# Patient Record
Sex: FEMALE | Race: WHITE | NOT HISPANIC OR LATINO
[De-identification: names, ages, dates, MRNs, and addresses within clinical notes are randomized per-mention and may not be internally consistent; named-entity substitution may affect disease eponyms.]

---

## 2017-01-13 ENCOUNTER — HOSPITAL (OUTPATIENT)
Dept: OTHER | Age: 22
End: 2017-01-13
Attending: INTERNAL MEDICINE

## 2021-07-06 PROBLEM — F41.9 ANXIETY: Status: ACTIVE | Noted: 2021-07-06

## 2021-07-06 PROCEDURE — 88175 CYTOPATH C/V AUTO FLUID REDO: CPT | Performed by: OBSTETRICS & GYNECOLOGY

## 2021-07-06 NOTE — PROGRESS NOTES
GYN H&P   NEW PT    2021  2:24 PM    CC: Patient is here for birth control, annual, anxiety    HPI: Patient is a 23year old  for birth control. In serious relationship and would like bc.  She has not been sexually active in the past and has neve History      Marital status: Single      Spouse name: Not on file      Number of children: Not on file      Years of education: Not on file      Highest education level: Not on file    Occupational History      Occupation:     Tobacco Use      Smoking the patient the procedure. I discussed the normal SE of break through vaginal bleeding which could last up to 90 days as well as some short term lower abdominal cramping.   I discussed with her to call me if she noticed worsening lower abdominal pain, aysha

## 2021-07-08 ENCOUNTER — TELEPHONE (OUTPATIENT)
Dept: OBGYN CLINIC | Facility: CLINIC | Age: 26
End: 2021-07-08

## 2021-07-08 NOTE — TELEPHONE ENCOUNTER
Patient has questions on the after effect of getting the IUD. Patient is currently feeling discomfort from her IUD.

## 2021-07-08 NOTE — TELEPHONE ENCOUNTER
Called pt and c/o nausea with cramps. Informed pt to take Ibuprofen for cramps and give her body time to become accustom to the IUD. Advised to call back if severe abd pain and saturating her pad hourly causing lightheadedness/dizziness.   Encouraged to se Statement Selected

## 2021-08-19 ENCOUNTER — OFFICE VISIT (OUTPATIENT)
Dept: OBGYN CLINIC | Facility: CLINIC | Age: 26
End: 2021-08-19
Payer: COMMERCIAL

## 2021-08-19 VITALS
BODY MASS INDEX: 20.55 KG/M2 | DIASTOLIC BLOOD PRESSURE: 74 MMHG | WEIGHT: 116 LBS | HEIGHT: 63 IN | SYSTOLIC BLOOD PRESSURE: 110 MMHG

## 2021-08-19 DIAGNOSIS — Z30.431 IUD CHECK UP: Primary | ICD-10-CM

## 2021-08-19 DIAGNOSIS — F41.9 ANXIETY: ICD-10-CM

## 2021-08-19 PROCEDURE — 3074F SYST BP LT 130 MM HG: CPT | Performed by: OBSTETRICS & GYNECOLOGY

## 2021-08-19 PROCEDURE — 3008F BODY MASS INDEX DOCD: CPT | Performed by: OBSTETRICS & GYNECOLOGY

## 2021-08-19 PROCEDURE — 99213 OFFICE O/P EST LOW 20 MIN: CPT | Performed by: OBSTETRICS & GYNECOLOGY

## 2021-08-19 PROCEDURE — 3078F DIAST BP <80 MM HG: CPT | Performed by: OBSTETRICS & GYNECOLOGY

## 2021-08-19 RX ORDER — LEVONORGESTREL 19.5 MG/1
1 INTRAUTERINE DEVICE INTRAUTERINE ONCE
COMMUNITY

## 2021-08-19 NOTE — PROGRESS NOTES
CC: Patient is here for IUD check up. HPI: Patient is a 23year old  for IUD check. She has had spotting, but no heavy bleeding, some mild cramps. She has been using Lexapro and feels like her anxiety is better.  She has not found a therapist Asked        Hobby Hazards: Not Asked        Sleep Concern: Not Asked        Stress Concern: Not Asked        Weight Concern: Not Asked        Special Diet: Not Asked        Back Care: Not Asked        Exercise: Not Asked        Bike Helmet: Not Asked Uterus: midline, mobile, non-tender, firm and smooth  Cervix: pink, no lesions grossly visible, no discharge - IUD string 1 cm from os  Adnexa: non tender, no palpable masses, normal size  Anus:  No lesions or visible hemorrhoids        A/P: Patient is 3

## 2021-12-28 ENCOUNTER — TELEPHONE (OUTPATIENT)
Dept: OBGYN CLINIC | Facility: CLINIC | Age: 26
End: 2021-12-28

## 2021-12-28 NOTE — TELEPHONE ENCOUNTER
The patient is calling with a question about her medication:      escitalopram (LEXAPRO) 10 MG Oral Tab

## 2021-12-28 NOTE — TELEPHONE ENCOUNTER
RN contacted pt. Pt wanting to discuss alternate medications due to pt's cardiologist concerns with lexapro. RN scheduled pt with MP to establish care. Appt scheduled for tomorrow at 56 Pt agrees to [address] [facility].

## 2021-12-29 ENCOUNTER — MED REC SCAN ONLY (OUTPATIENT)
Dept: FAMILY MEDICINE CLINIC | Facility: CLINIC | Age: 26
End: 2021-12-29

## 2021-12-29 PROBLEM — G40.909 SEIZURE DISORDER (HCC): Status: ACTIVE | Noted: 2021-12-29

## 2021-12-29 PROBLEM — G47.00 INSOMNIA: Status: ACTIVE | Noted: 2021-12-29

## 2021-12-29 PROBLEM — F32.A DEPRESSIVE DISORDER: Status: ACTIVE | Noted: 2021-12-29

## 2021-12-29 PROBLEM — F41.1 GAD (GENERALIZED ANXIETY DISORDER): Status: ACTIVE | Noted: 2021-12-29

## 2021-12-29 PROBLEM — R94.31 QT PROLONGATION: Status: ACTIVE | Noted: 2021-12-29

## 2021-12-29 NOTE — PROGRESS NOTES
HPI:    Patient ID: Abimbola Herrera is a 23year old female who presents for anxiety medication. HPI  Takes keppra 2000mg in PM for epilepsy. Recently diagnosed in 10/2021.  Thinks she had been having seizures in her sleep for at least 1 year b/c she'd Coughing    Family History   Problem Relation Age of Onset   • Other (Liver Cancer) Paternal Grandmother 79   • Other (Lung Cancer) Paternal Grandfather 36   • Other (Lung cancer) Paternal Uncle    • Heart Disease Maternal Uncle         Great uncle   • breath. Cardiovascular: Negative. Negative for chest pain and palpitations. Neurological: Positive for seizures. Negative for dizziness, syncope and light-headedness. Psychiatric/Behavioral: Positive for dysphoric mood and sleep disturbance.  The pa Gait: Gait normal.   Psychiatric:         Mood and Affect: Mood normal.         Behavior: Behavior normal.         Thought Content:  Thought content normal.         Judgment: Judgment normal.             ASSESSMENT/PLAN:   Mike (generalized anxiety disorder)

## 2022-01-28 ENCOUNTER — MED REC SCAN ONLY (OUTPATIENT)
Dept: FAMILY MEDICINE CLINIC | Facility: CLINIC | Age: 27
End: 2022-01-28

## 2022-03-07 ENCOUNTER — TELEPHONE (OUTPATIENT)
Dept: FAMILY MEDICINE CLINIC | Facility: CLINIC | Age: 27
End: 2022-03-07

## 2022-03-07 RX ORDER — VENLAFAXINE HYDROCHLORIDE 37.5 MG/1
37.5 TABLET, EXTENDED RELEASE ORAL DAILY
Qty: 90 TABLET | Refills: 3 | Status: SHIPPED | OUTPATIENT
Start: 2022-03-07

## 2022-03-07 NOTE — TELEPHONE ENCOUNTER
Venlafaxine HCl ER 37.5 MG Oral Tablet 24 Hr    [facility] [id number] - [facility], [address] [phone], [phone]      Pt requesting a couple of refills so she does not have to call to renew each time if possible. Please contact patient to confirm.

## 2022-03-24 ENCOUNTER — MED REC SCAN ONLY (OUTPATIENT)
Dept: FAMILY MEDICINE CLINIC | Facility: CLINIC | Age: 27
End: 2022-03-24

## 2022-08-15 ENCOUNTER — OFFICE VISIT (OUTPATIENT)
Dept: OBGYN CLINIC | Facility: CLINIC | Age: 27
End: 2022-08-15
Payer: COMMERCIAL

## 2022-08-15 VITALS
HEIGHT: 63 IN | WEIGHT: 128 LBS | BODY MASS INDEX: 22.68 KG/M2 | DIASTOLIC BLOOD PRESSURE: 60 MMHG | SYSTOLIC BLOOD PRESSURE: 102 MMHG

## 2022-08-15 DIAGNOSIS — N94.6 DYSMENORRHEA: ICD-10-CM

## 2022-08-15 DIAGNOSIS — Z30.431 IUD CHECK UP: Primary | ICD-10-CM

## 2022-08-15 PROCEDURE — 3078F DIAST BP <80 MM HG: CPT | Performed by: OBSTETRICS & GYNECOLOGY

## 2022-08-15 PROCEDURE — 99213 OFFICE O/P EST LOW 20 MIN: CPT | Performed by: OBSTETRICS & GYNECOLOGY

## 2022-08-15 PROCEDURE — 87591 N.GONORRHOEAE DNA AMP PROB: CPT | Performed by: OBSTETRICS & GYNECOLOGY

## 2022-08-15 PROCEDURE — 3074F SYST BP LT 130 MM HG: CPT | Performed by: OBSTETRICS & GYNECOLOGY

## 2022-08-15 PROCEDURE — 87491 CHLMYD TRACH DNA AMP PROBE: CPT | Performed by: OBSTETRICS & GYNECOLOGY

## 2022-08-15 PROCEDURE — 3008F BODY MASS INDEX DOCD: CPT | Performed by: OBSTETRICS & GYNECOLOGY

## 2022-08-15 RX ORDER — NAPROXEN 500 MG/1
500 TABLET ORAL 2 TIMES DAILY WITH MEALS
Qty: 30 TABLET | Refills: 1 | Status: SHIPPED | OUTPATIENT
Start: 2022-08-15 | End: 2022-08-20

## 2022-08-16 ENCOUNTER — HOSPITAL ENCOUNTER (OUTPATIENT)
Dept: ULTRASOUND IMAGING | Age: 27
Discharge: HOME | End: 2022-08-16
Attending: OBSTETRICS & GYNECOLOGY
Payer: COMMERCIAL

## 2022-08-16 DIAGNOSIS — Z30.431 IUD CHECK UP: ICD-10-CM

## 2022-08-16 DIAGNOSIS — N94.6 DYSMENORRHEA: ICD-10-CM

## 2022-08-16 LAB
C TRACH DNA SPEC QL NAA+PROBE: NEGATIVE
N GONORRHOEA DNA SPEC QL NAA+PROBE: NEGATIVE

## 2022-08-16 PROCEDURE — 76856 US EXAM PELVIC COMPLETE: CPT | Performed by: OBSTETRICS & GYNECOLOGY

## 2022-08-16 PROCEDURE — 76830 TRANSVAGINAL US NON-OB: CPT | Performed by: OBSTETRICS & GYNECOLOGY

## 2023-01-17 ENCOUNTER — OFFICE VISIT (OUTPATIENT)
Dept: OBGYN CLINIC | Facility: CLINIC | Age: 28
End: 2023-01-17
Payer: COMMERCIAL

## 2023-01-17 VITALS
DIASTOLIC BLOOD PRESSURE: 64 MMHG | SYSTOLIC BLOOD PRESSURE: 100 MMHG | WEIGHT: 127 LBS | HEIGHT: 63 IN | BODY MASS INDEX: 22.5 KG/M2

## 2023-01-17 DIAGNOSIS — Z30.432 ENCOUNTER FOR REMOVAL OF INTRAUTERINE CONTRACEPTIVE DEVICE: ICD-10-CM

## 2023-01-17 DIAGNOSIS — Z30.09 FAMILY PLANNING: Primary | ICD-10-CM

## 2023-01-17 PROCEDURE — 58301 REMOVE INTRAUTERINE DEVICE: CPT | Performed by: OBSTETRICS & GYNECOLOGY

## 2023-01-17 PROCEDURE — 99213 OFFICE O/P EST LOW 20 MIN: CPT | Performed by: OBSTETRICS & GYNECOLOGY

## 2023-01-17 PROCEDURE — 3074F SYST BP LT 130 MM HG: CPT | Performed by: OBSTETRICS & GYNECOLOGY

## 2023-01-17 PROCEDURE — 3078F DIAST BP <80 MM HG: CPT | Performed by: OBSTETRICS & GYNECOLOGY

## 2023-01-17 PROCEDURE — 3008F BODY MASS INDEX DOCD: CPT | Performed by: OBSTETRICS & GYNECOLOGY

## 2023-03-02 RX ORDER — VENLAFAXINE HYDROCHLORIDE 37.5 MG/1
37.5 TABLET, EXTENDED RELEASE ORAL DAILY
Qty: 90 TABLET | Refills: 0 | Status: SHIPPED | OUTPATIENT
Start: 2023-03-02

## 2023-03-02 NOTE — TELEPHONE ENCOUNTER
Please review. Protocol Failed or has No Protocol. Requested Prescriptions   Pending Prescriptions Disp Refills    Venlafaxine HCl ER 37.5 MG Oral Tablet 24 Hr 90 tablet 3     Sig: Take 1 tablet (37.5 mg total) by mouth daily.        There is no refill protocol information for this order              Recent Outpatient Visits              1 month ago [address] Dave Carrizales MD    Office Visit    6 months ago IUD check up    [address] Dave Carrizales MD    Office Visit    8 months ago MICHELLE (generalized anxiety disorder)    Daniel Asif, [address],     Office Visit    1 year ago MICHELLE (generalized anxiety disorder)    DO Wendy    Office Visit    1 year ago MICHELLE (generalized anxiety disorder)    Daniel Asif, [place], [place]    Office Visit

## 2023-07-31 RX ORDER — VENLAFAXINE HYDROCHLORIDE 37.5 MG/1
37.5 CAPSULE, EXTENDED RELEASE ORAL DAILY
Qty: 30 CAPSULE | Refills: 0 | Status: SHIPPED | OUTPATIENT
Start: 2023-07-31

## 2023-08-29 RX ORDER — VENLAFAXINE HYDROCHLORIDE 37.5 MG/1
37.5 CAPSULE, EXTENDED RELEASE ORAL DAILY
Qty: 30 CAPSULE | Refills: 0 | Status: SHIPPED | OUTPATIENT
Start: 2023-08-29

## 2023-09-26 RX ORDER — VENLAFAXINE HYDROCHLORIDE 37.5 MG/1
37.5 CAPSULE, EXTENDED RELEASE ORAL DAILY
Qty: 10 CAPSULE | Refills: 0 | Status: SHIPPED | OUTPATIENT
Start: 2023-09-26

## 2023-09-26 NOTE — TELEPHONE ENCOUNTER
Please review. Protocol failed / Has no protocol. Recent Visits  Date Type Provider Dept   06/23/22 Office Visit Yamileth Blanco DO [facility]   Showing recent visits within past 540 days with a meds authorizing provider and meeting all other requirements  Future Appointments  No visits were found meeting these conditions.   Showing future appointments within next 150 days with a meds authorizing provider and meeting all other requirements      Requested Prescriptions   Pending Prescriptions Disp Refills    VENLAFAXINE ER 37.5 MG Oral Capsule SR 24 Hr [Pharmacy Med Name: VENLAFAXINE HCL ER 37.5 MG CAP] 30 capsule 0     Sig: TAKE 1 CAPSULE BY MOUTH DAILY       Psychiatric Non-Scheduled (Anti-Anxiety) Failed - 9/26/2023  5:04 AM        Failed - In person appointment or virtual visit in the past 6 mos or appointment in next 3 mos     Recent Outpatient Visits              8 months ago Family planning    [address], [address], [address] [facility] Dave Julien MD    Office Visit    1 year ago IUD check up    [address], [address], [address] [facility] Dave Julien MD    Office Visit    1 year ago MICHELLE (generalized anxiety disorder)    [address], [address], [facility] Yamileth Blanco DO    Office Visit    1 year ago MICHELLE (generalized anxiety disorder)    [address] [place] [address] Yamileth Blanco DO    Office Visit    1 year ago MICHELLE (generalized anxiety disorder)    [address] [place] [address] Yamileth Blanco DO    Office Visit                            Recent Outpatient Visits              8 months ago Family planning    Khalida Weldon MD    Office Visit    1 year ago IUD check up    [address], [address], Em Parra MD    Office Visit    1 year ago MICHELLE (generalized anxiety disorder)    [facility], [address] [place] [address], [address] Audrey Gonzales [facility]    Office Visit    1 year ago MICHELLE (generalized anxiety disorder)    [facility], [address] [place] [address], [address] Audrey Gonzales     Office Visit    1 year ago MICHELLE (generalized anxiety disorder)    [facility], [address], [facility] Audrey Gonzales [place]    Office Visit

## 2023-10-05 RX ORDER — VENLAFAXINE HYDROCHLORIDE 37.5 MG/1
37.5 CAPSULE, EXTENDED RELEASE ORAL DAILY
Qty: 60 CAPSULE | Refills: 0 | Status: SHIPPED | OUTPATIENT
Start: 2023-10-05

## 2023-10-05 NOTE — TELEPHONE ENCOUNTER
Refill passed per [address] [place] protocol. Please see patients [address]  P Em Triage SupportYesterday (8:55 AM)     Mino Saunders  Refills have been requested for the following medications:         venlafaxine ER 37.5 MG Oral Capsule SR 24 Hr [Chelsea Holt]      Patient Comment: Would it be possible to get a 30 or 60-day supply so that I don't have to request a refill every week? Please see message below for upcoming appointment. Future Appointments   Date Time Provider Memo Thomas   12/5/2023  9:30 AM Anahi Pérez DO [facility]       Requested Prescriptions   Pending Prescriptions Disp Refills    venlafaxine ER 37.5 MG Oral Capsule SR 24 Hr 10 capsule 0     Sig: Take 1 capsule (37.5 mg total) by mouth daily. **Appointment needed for further refills.        Psychiatric Non-Scheduled (Anti-Anxiety) Passed - 10/4/2023  8:55 AM        Passed - In person appointment or virtual visit in the past 6 mos or appointment in next 3 mos     Recent Outpatient Visits              8 months ago [address] Dave Gonzalez MD    Office Visit    1 year ago IUD check up    [address], [address] Dave Gonzalez MD    Office Visit    1 year ago MICHELLE (generalized anxiety disorder)    [address], [address], [facility] Anahi Pérez DO    Office Visit    1 year ago MICHELLE (generalized anxiety disorder)    [address] [place] [address] Anahi Pérez DO    Office Visit    1 year ago MICHELLE (generalized anxiety disorder)    [address] [place] [address] Anahi Pérez DO    Office Visit          Future Appointments         Provider Department Appt Notes    In 2 months Anahi Pérez DO [address] [place] [address] Just a routine physical                       Recent Outpatient Visits              8 months ago Family planning    [address], [facility] [address] Dave Villalobos MD    Office Visit    1 year ago IUD check up    [address], [address], [address] Dave Villalobos MD    Office Visit    1 year ago MICHELLE (generalized anxiety disorder)    [address] [facility] Dexter Sanders DO    Office Visit    1 year ago MICHELLE (generalized anxiety disorder)    [address] [facility] Dexter Sanders DO    Office Visit    1 year ago MICHELLE (generalized anxiety disorder)    [address] [facility], [facility] Dexter Sanders [place]    Office Visit          Future Appointments         Provider Department Appt Notes    [facility] [address] Just a routine physical

## 2023-12-05 ENCOUNTER — OFFICE VISIT (OUTPATIENT)
Facility: CLINIC | Age: 28
End: 2023-12-05
Payer: COMMERCIAL

## 2023-12-05 VITALS
SYSTOLIC BLOOD PRESSURE: 116 MMHG | BODY MASS INDEX: 23.39 KG/M2 | HEIGHT: 63 IN | DIASTOLIC BLOOD PRESSURE: 72 MMHG | OXYGEN SATURATION: 98 % | WEIGHT: 132 LBS | HEART RATE: 84 BPM

## 2023-12-05 DIAGNOSIS — R94.31 QT PROLONGATION: ICD-10-CM

## 2023-12-05 DIAGNOSIS — L60.0 INGROWN TOENAIL OF RIGHT FOOT: ICD-10-CM

## 2023-12-05 DIAGNOSIS — Z00.00 PHYSICAL EXAM, ANNUAL: Primary | ICD-10-CM

## 2023-12-05 DIAGNOSIS — F32.A DEPRESSIVE DISORDER: ICD-10-CM

## 2023-12-05 DIAGNOSIS — F41.1 GAD (GENERALIZED ANXIETY DISORDER): ICD-10-CM

## 2023-12-05 PROCEDURE — 90471 IMMUNIZATION ADMIN: CPT | Performed by: FAMILY MEDICINE

## 2023-12-05 PROCEDURE — 99395 PREV VISIT EST AGE 18-39: CPT | Performed by: FAMILY MEDICINE

## 2023-12-05 PROCEDURE — 3074F SYST BP LT 130 MM HG: CPT | Performed by: FAMILY MEDICINE

## 2023-12-05 PROCEDURE — 3008F BODY MASS INDEX DOCD: CPT | Performed by: FAMILY MEDICINE

## 2023-12-05 PROCEDURE — 90715 TDAP VACCINE 7 YRS/> IM: CPT | Performed by: FAMILY MEDICINE

## 2023-12-05 PROCEDURE — 3078F DIAST BP <80 MM HG: CPT | Performed by: FAMILY MEDICINE

## 2023-12-05 PROCEDURE — 99214 OFFICE O/P EST MOD 30 MIN: CPT | Performed by: FAMILY MEDICINE

## 2023-12-05 RX ORDER — VENLAFAXINE HYDROCHLORIDE 75 MG/1
75 CAPSULE, EXTENDED RELEASE ORAL DAILY
Qty: 90 CAPSULE | Refills: 0 | Status: SHIPPED | OUTPATIENT
Start: 2023-12-05

## 2023-12-05 RX ORDER — VENLAFAXINE HYDROCHLORIDE 37.5 MG/1
37.5 CAPSULE, EXTENDED RELEASE ORAL DAILY
Qty: 60 CAPSULE | Refills: 0 | OUTPATIENT
Start: 2023-12-05

## 2023-12-12 DIAGNOSIS — Z30.09 FAMILY PLANNING: ICD-10-CM

## 2023-12-12 RX ORDER — NORGESTREL-ETHINYL ESTRADIOL 0.3-0.03MG
1 TABLET ORAL DAILY
Qty: 84 TABLET | Refills: 0 | Status: SHIPPED | OUTPATIENT
Start: 2023-12-12 | End: 2024-03-04

## 2024-03-02 DIAGNOSIS — Z30.09 FAMILY PLANNING: ICD-10-CM

## 2024-03-04 RX ORDER — NORGESTREL-ETHINYL ESTRADIOL 0.3-0.03MG
1 TABLET ORAL DAILY
Qty: 84 TABLET | Refills: 0 | Status: SHIPPED | OUTPATIENT
Start: 2024-03-04 | End: 2024-06-03

## 2024-03-11 RX ORDER — VENLAFAXINE HYDROCHLORIDE 75 MG/1
75 CAPSULE, EXTENDED RELEASE ORAL DAILY
Qty: 90 CAPSULE | Refills: 3 | Status: SHIPPED | OUTPATIENT
Start: 2024-03-11

## 2024-03-11 NOTE — TELEPHONE ENCOUNTER
Refill passed per [facility] protocol.    Requested Prescriptions   Pending Prescriptions Disp Refills    venlafaxine ER 75 MG Oral Capsule SR 24 Hr 90 capsule 0     Sig: Take 1 capsule (75 mg total) by mouth daily.       Psychiatric Non-Scheduled (Anti-Anxiety) Passed - 3/11/2024  8:34 AM        Passed - In person appointment or virtual visit in the past 6 mos or appointment in next 3 mos     Recent Outpatient Visits              3 months ago Physical exam, annual    [facility] Rober Boston DO    Office Visit    1 year ago Family planning    [facility] - OB/GYN Kimberly Chaparro MD    Office Visit    1 year ago IUD check up    [facility]/Kimberly Gamble MD    Office Visit    1 year ago MICHELLE (generalized anxiety disorder)    [facility] Rober Boston DO    Office Visit    2 years ago MICHELLE (generalized anxiety disorder)    [facility], [facility] Rober Boston DO    Office Visit          Future Appointments         Provider Department Appt Notes    In 1 month Rober Boston DO [facility] Wanted to follow up about medication dosage increase               Passed - Depression Screening completed within the past 12 months             Recent Outpatient Visits              3 months ago Physical exam, annual    [facility], [facility] Rober Boston DO    Office Visit    1 year ago Family planning    [facility] - OB/GYN Kimberly Chaparro MD    Office Visit    1 year ago IUD check up    [facility] OB/Kimberly Gamble MD    Office Visit    1 year ago MICHELLE (generalized anxiety disorder)    [facility] Rober Boston DO    Office Visit    2 years ago MICHELLE (generalized anxiety disorder)    [facility] Rober Boston DO    Office Visit            Future Appointments         Provider Department Appt Notes    In 1 month Rober Boston DO [facility] Wanted to follow up about medication dosage increase

## 2024-06-01 DIAGNOSIS — Z30.09 FAMILY PLANNING: ICD-10-CM

## 2024-06-03 RX ORDER — NORGESTREL-ETHINYL ESTRADIOL 0.3-0.03MG
1 TABLET ORAL DAILY
Qty: 84 TABLET | Refills: 0 | Status: SHIPPED | OUTPATIENT
Start: 2024-06-03 | End: 2024-07-24

## 2024-07-23 DIAGNOSIS — Z30.09 FAMILY PLANNING: ICD-10-CM

## 2024-07-24 RX ORDER — NORGESTREL-ETHINYL ESTRADIOL 0.3-0.03MG
1 TABLET ORAL DAILY
Qty: 84 TABLET | Refills: 0 | Status: SHIPPED | OUTPATIENT
Start: 2024-07-24

## 2024-11-11 DIAGNOSIS — Z30.09 FAMILY PLANNING: ICD-10-CM

## 2024-11-11 RX ORDER — NORGESTREL-ETHINYL ESTRADIOL 0.3-0.03MG
1 TABLET ORAL DAILY
Qty: 84 TABLET | Refills: 0 | Status: SHIPPED | OUTPATIENT
Start: 2024-11-11 | End: 2025-01-27

## 2025-01-25 DIAGNOSIS — Z30.09 FAMILY PLANNING: ICD-10-CM

## 2025-01-27 RX ORDER — NORGESTREL-ETHINYL ESTRADIOL 0.3-0.03MG
1 TABLET ORAL DAILY
Qty: 28 TABLET | Refills: 0 | Status: SHIPPED | OUTPATIENT
Start: 2025-01-27 | End: 2025-03-10

## 2025-03-10 ENCOUNTER — OFFICE VISIT (OUTPATIENT)
Dept: OBGYN CLINIC | Facility: CLINIC | Age: 30
End: 2025-03-10
Payer: COMMERCIAL

## 2025-03-10 VITALS
WEIGHT: 143 LBS | SYSTOLIC BLOOD PRESSURE: 100 MMHG | HEIGHT: 63 IN | DIASTOLIC BLOOD PRESSURE: 72 MMHG | BODY MASS INDEX: 25.34 KG/M2

## 2025-03-10 DIAGNOSIS — Z01.419 ENCOUNTER FOR GYNECOLOGICAL EXAMINATION WITHOUT ABNORMAL FINDING: Primary | ICD-10-CM

## 2025-03-10 DIAGNOSIS — Z30.09 FAMILY PLANNING: ICD-10-CM

## 2025-03-10 PROCEDURE — 87491 CHLMYD TRACH DNA AMP PROBE: CPT | Performed by: OBSTETRICS & GYNECOLOGY

## 2025-03-10 PROCEDURE — 87591 N.GONORRHOEAE DNA AMP PROB: CPT | Performed by: OBSTETRICS & GYNECOLOGY

## 2025-03-10 RX ORDER — FLUTICASONE PROPIONATE 93 UG/1
SPRAY, METERED NASAL
COMMUNITY
Start: 2025-02-27

## 2025-03-10 RX ORDER — NORGESTREL-ETHINYL ESTRADIOL 0.3-0.03MG
1 TABLET ORAL DAILY
Qty: 84 TABLET | Refills: 3 | Status: SHIPPED | OUTPATIENT
Start: 2025-03-10

## 2025-03-10 NOTE — PROGRESS NOTES
GYN H&P     3/10/2025  5:21 PM    CC: Patient is here for annual.     HPI: Patient is a 29 year old  for annual. Needs pap    Menses: regular periods with minimal pain with OCP    Currently in therapy and taking meds for depression     Patient's last menstrual period was 2025.    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    Seizure disorder (HCC)    Sleep apnea     Past Surgical History:   Procedure Laterality Date    Adenoidectomy  2008    Tonsillectomy      [place] teeth removed       Allergies[1]  Family History   Problem Relation Age of Onset    Other (Liver Cancer) Paternal Grandmother 70    Cancer Paternal Grandmother     Other (Lung Cancer) Paternal Grandfather 40    Cancer Paternal Grandfather     Other (Lung cancer) Paternal Uncle     Heart Disease Maternal Uncle         Great uncle    No Known Problems Mother     No Known Problems Father     No Known Problems Brother     High Blood Pressure Maternal Grandfather     Other (multiple sclerosis) Maternal Grandfather     Heart Disorder Maternal Grandfather     High Blood Pressure Maternal Aunt     Breast Cancer Neg     Colon Cancer Neg     Ovarian Cancer Neg      Social History     Socioeconomic History    Marital status: Single   Occupational History    Occupation: Teacher at [employer]   Tobacco Use    Smoking status: Never    Smokeless tobacco: Never   Vaping Use    Vaping status: Never Used   Substance and Sexual Activity    Alcohol use: Yes    Drug use: Never    Sexual activity: Yes     Partners: Male     Birth control/protection: OCP     Comment: Ivon     Social History     Social History Narrative    Live with parents and brother    Feels safe.      No h/o abuse.        Medications reviewed. See active list.     /72   Ht 63\"   Wt 143 lb (64.9 kg)   LMP 03/08/2025   BMI 25.33 kg/m²       Exam:   GENERAL: well developed, well nourished, in no apparent distress  SKIN: no rashes, no suspicious lesions  HEENT: normal  NECK: supple; no thyroidmegaly, no adenopathy  BREASTS: symmetrical, nontender, no palpable masses or nodes, no nipple discharge, no skin changes, no dippling, no palpable axillary adenopathy  ABDOMEN: Soft, non distended; non tender, no masses.  Liver and spleen non-tender, no enlargement. No palpable hernias  GYNE/:  External Genitalia: Normal appearing, no lesions, normal hair distribution   Urethral meatus appear wnl, no abnormal discharge or lesions noted.   Bladder: well supported, urethra wnl, no palpable tenderness or masses, no discharge  Vagina: normal pink mucosa, no lesions, normal clear discharge.   Uterus: midline, mobile, non-tender, firm and smooth  Cervix: pink, no lesions grossly visible, no discharge  Adnexa: non tender, no palpable masses, normal size  Anus:  No lesions or visible hemorrhoids        A/P: Patient is 29 year old female     1. Family planning  - norgestrel-ethinyl estradiol (CRYSELLE-28) 0.3-30 MG-MCG Oral Tab; Take 1 tablet by mouth daily.  Dispense: 84 tablet; Refill: 3    2. Encounter for gynecological examination without abnormal finding  - ThinPrep PAP with HPV Reflex Request; Future  - norgestrel-ethinyl estradiol (CRYSELLE-28) 0.3-30 MG-MCG Oral Tab; Take 1 tablet by mouth daily.  Dispense: 84 tablet; Refill: 3      Kimberly Chaparro MD              [1]   Allergies  Allergen Reactions    Benadryl [Diphenhydramine] UNKNOWN    Dust Mite Extract Coughing

## 2025-03-11 LAB
C TRACH DNA SPEC QL NAA+PROBE: NEGATIVE
N GONORRHOEA DNA SPEC QL NAA+PROBE: NEGATIVE

## 2025-03-12 RX ORDER — VENLAFAXINE HYDROCHLORIDE 75 MG/1
75 CAPSULE, EXTENDED RELEASE ORAL DAILY
Qty: 90 CAPSULE | Refills: 0 | Status: SHIPPED | OUTPATIENT
Start: 2025-03-12

## 2025-03-12 NOTE — TELEPHONE ENCOUNTER
Please kindly review this medication    [x] FAILS PROTOCOL    [] HAS NO PROTOCOL ATTACHED     MyChart message sent to patient to schedule an office visit with PCP.   Routed to 14  staff to call patient and make an appointment.

## 2025-03-12 NOTE — TELEPHONE ENCOUNTER
Please call patient to assist in making an appointment. Thank you     **Please attempt all available phone numbers in patient's chart. This includes alternative numbers and contacts on patient's release of information. Thank you.

## 2025-06-09 NOTE — TELEPHONE ENCOUNTER
Please review.  Protocol failed / Has no protocol.    Todacell message sent to patient to schedule an office visit with Primary Care Provider.   Will also route to 14 Front Office to make a phone attempt.      Requested Prescriptions   Pending Prescriptions Disp Refills    venlafaxine ER 75 MG Oral Capsule SR 24 Hr [Pharmacy Med Name: VENLAFAXINE HCL ER 75 MG CAP] 30 capsule 0     Sig: Take 1 capsule (75 mg total) by mouth daily. **Appointment needed for further refills.       Psychiatric Non-Scheduled (Anti-Anxiety) Failed - 6/9/2025  6:56 PM        Failed - In person appointment or virtual visit in the past 6 mos or appointment in next 3 mos        Passed - Depression Screening completed within the past 12 months        Passed - Medication is active on med list

## 2025-06-09 NOTE — TELEPHONE ENCOUNTER
Please call patient to make an appointment.  Thank you   Overdue for annual Physical Exam  MyChart Message sent to patient.

## 2025-06-10 RX ORDER — VENLAFAXINE HYDROCHLORIDE 75 MG/1
75 CAPSULE, EXTENDED RELEASE ORAL DAILY
Qty: 30 CAPSULE | Refills: 0 | Status: SHIPPED | OUTPATIENT
Start: 2025-06-10

## 2025-07-15 RX ORDER — VENLAFAXINE HYDROCHLORIDE 75 MG/1
75 CAPSULE, EXTENDED RELEASE ORAL DAILY
Qty: 90 CAPSULE | Refills: 0 | Status: SHIPPED | OUTPATIENT
Start: 2025-07-15

## 2025-07-15 NOTE — TELEPHONE ENCOUNTER
Please review. Protocol Failed; No Protocol    No future appointments.    Badoo message sent to patient to schedule an office visit with Provider and/or  Routed to Patient  for assistance with appointment.

## 2025-07-15 NOTE — TELEPHONE ENCOUNTER
Outreach patient and stated the message below, per patient agreed to schedule appointment and has been scheduled for 10/14/25.

## 2025-07-15 NOTE — TELEPHONE ENCOUNTER
Patient scheduled for 10/2025  Last seen 4/2024    Are you willing to fill until visit or possibly we can contact and schedule for video visit for just this and keep October visit.     Please advise on what is preferred

## (undated) NOTE — MR AVS SNAPSHOT
After Visit Summary   7/6/2021    Caty Manual    MRN: [id number]           Visit Information     Date & Time  7/6/2021  2:00 PM Provider  Mitchell Chaparro MD [address] [facility] Dept. Internet browser, go to [email or web address] [employer]  2. Click on the Activate your Account if you have an activation code in the box under the *New User? section. 3. Enter your [employer] Activation Code exactly as it appears below.  You will not n visit [email or web address]                   DO YOU KNOW WHERE TO GO? Injury & Illness are never convenient. If you are dealing with a   non-emergency, consider your options before heading to an ER.   [address] VISITS  Visit face-to-fac 1. [phone]. ([phone])

## (undated) NOTE — LETTER
Dashawn Mack, :4/3/1995    CONSENT FOR PROCEDURE/SEDATION    1. I authorize the performance upon Dashawn Mack  the following: Hiwot Most IUD Insertion    2.  I authorize Dr. Chary Edgar MD (and whomever is designated as the doctor ____________________________________________    Witness: _________________________________________ Date:___________     Physician Signature: _______________________________ Date:___________